# Patient Record
Sex: MALE | URBAN - METROPOLITAN AREA
[De-identification: names, ages, dates, MRNs, and addresses within clinical notes are randomized per-mention and may not be internally consistent; named-entity substitution may affect disease eponyms.]

---

## 2020-05-22 ENCOUNTER — APPOINTMENT (RX ONLY)
Dept: URBAN - METROPOLITAN AREA CLINIC 157 | Facility: CLINIC | Age: 45
Setting detail: DERMATOLOGY
End: 2020-05-22

## 2020-05-22 DIAGNOSIS — D22 MELANOCYTIC NEVI: ICD-10-CM

## 2020-05-22 DIAGNOSIS — L91.8 OTHER HYPERTROPHIC DISORDERS OF THE SKIN: ICD-10-CM

## 2020-05-22 DIAGNOSIS — L73.8 OTHER SPECIFIED FOLLICULAR DISORDERS: ICD-10-CM

## 2020-05-22 DIAGNOSIS — Z85.828 PERSONAL HISTORY OF OTHER MALIGNANT NEOPLASM OF SKIN: ICD-10-CM

## 2020-05-22 DIAGNOSIS — D18.0 HEMANGIOMA: ICD-10-CM

## 2020-05-22 DIAGNOSIS — D49.2 NEOPLASM OF UNSPECIFIED BEHAVIOR OF BONE, SOFT TISSUE, AND SKIN: ICD-10-CM

## 2020-05-22 PROBLEM — D22.71 MELANOCYTIC NEVI OF RIGHT LOWER LIMB, INCLUDING HIP: Status: ACTIVE | Noted: 2020-05-22

## 2020-05-22 PROBLEM — D18.01 HEMANGIOMA OF SKIN AND SUBCUTANEOUS TISSUE: Status: ACTIVE | Noted: 2020-05-22

## 2020-05-22 PROBLEM — D22.5 MELANOCYTIC NEVI OF TRUNK: Status: ACTIVE | Noted: 2020-05-22

## 2020-05-22 PROCEDURE — ? BIOPSY BY SHAVE METHOD

## 2020-05-22 PROCEDURE — 11102 TANGNTL BX SKIN SINGLE LES: CPT

## 2020-05-22 PROCEDURE — 11200 RMVL SKIN TAGS UP TO&INC 15: CPT | Mod: 59

## 2020-05-22 PROCEDURE — 99203 OFFICE O/P NEW LOW 30 MIN: CPT | Mod: 25

## 2020-05-22 PROCEDURE — ? SKIN TAG REMOVAL

## 2020-05-22 PROCEDURE — ? COUNSELING

## 2020-05-22 ASSESSMENT — LOCATION DETAILED DESCRIPTION DERM
LOCATION DETAILED: LEFT CLAVICULAR NECK
LOCATION DETAILED: RIGHT SUPERIOR UPPER BACK
LOCATION DETAILED: RIGHT MEDIAL FOREHEAD
LOCATION DETAILED: RIGHT AXILLARY VAULT
LOCATION DETAILED: RIGHT INFERIOR LATERAL MIDBACK
LOCATION DETAILED: LEFT MEDIAL UPPER BACK
LOCATION DETAILED: LEFT SUPERIOR PARIETAL SCALP
LOCATION DETAILED: RIGHT DISTAL POSTERIOR THIGH

## 2020-05-22 ASSESSMENT — LOCATION ZONE DERM
LOCATION ZONE: AXILLAE
LOCATION ZONE: TRUNK
LOCATION ZONE: NECK
LOCATION ZONE: LEG
LOCATION ZONE: SCALP
LOCATION ZONE: FACE

## 2020-05-22 ASSESSMENT — LOCATION SIMPLE DESCRIPTION DERM
LOCATION SIMPLE: RIGHT UPPER BACK
LOCATION SIMPLE: RIGHT LOWER BACK
LOCATION SIMPLE: LEFT ANTERIOR NECK
LOCATION SIMPLE: RIGHT AXILLARY VAULT
LOCATION SIMPLE: LEFT UPPER BACK
LOCATION SIMPLE: RIGHT FOREHEAD
LOCATION SIMPLE: RIGHT POSTERIOR THIGH
LOCATION SIMPLE: SCALP

## 2020-05-22 NOTE — PROCEDURE: BIOPSY BY SHAVE METHOD
Silver Nitrate Text: The wound bed was treated with silver nitrate after the biopsy was performed.
Validate Note Data (See Information Below): Yes
Validate That Anesthesia Is Not 0: No
Notification Instructions: Patient will be notified of biopsy results. However, patient instructed to call the office if not contacted within 2 weeks.
Hemostasis: Geetha's
Consent: Written consent was obtained and risks were reviewed including but not limited to scarring, infection, bleeding, scabbing, incomplete removal, nerve damage and allergy to anesthesia.
Size Of Lesion In Cm: 0
Electrodesiccation Text: The wound bed was treated with electrodesiccation after the biopsy was performed.
Billing Type: Third-Party Bill
Biopsy Type: H and E
Wound Care: Vaseline
Type Of Destruction Used: Curettage
Biopsy Method: Dermablade
Dressing: bandage
Anesthesia Type: 1% lidocaine without epinephrine
Curettage Text: The wound bed was treated with curettage after the biopsy was performed.
Detail Level: Detailed
Anesthesia Volume In Cc: 0.5
Cryotherapy Text: The wound bed was treated with cryotherapy after the biopsy was performed.
Electrodesiccation And Curettage Text: The wound bed was treated with electrodesiccation and curettage after the biopsy was performed.
Depth Of Biopsy: dermis
Information: Selecting Yes will display possible errors in your note based on the variables you have selected. This validation is only offered as a suggestion for you. PLEASE NOTE THAT THE VALIDATION TEXT WILL BE REMOVED WHEN YOU FINALIZE YOUR NOTE. IF YOU WANT TO FAX A PRELIMINARY NOTE YOU WILL NEED TO TOGGLE THIS TO 'NO' IF YOU DO NOT WANT IT IN YOUR FAXED NOTE.
Post-Care Instructions: I reviewed with the patient in detail post-care instructions. Patient is to keep the biopsy site dry overnight, and then apply bacitracin twice daily until healed. Patient may apply hydrogen peroxide soaks to remove any crusting.

## 2020-05-22 NOTE — PROCEDURE: SKIN TAG REMOVAL
Consent: Written consent obtained and the risks of skin tag removal was reviewed with the patient including but not limited to bleeding, pigmentary change, infection, pain, and remote possibility of scarring.
Include Z78.9 (Other Specified Conditions Influencing Health Status) As An Associated Diagnosis?: No
Detail Level: Detailed
Medical Necessity Clause: This procedure was medically necessary because the lesions that were treated were:
Medical Necessity Information: It is in your best interest to select a reason for this procedure from the list below. All of these items fulfill various CMS LCD requirements except the new and changing color options.
Add Associated Diagnoses If Applicable When Selecting Medical Necessity: Yes

## 2022-05-03 PROCEDURE — 88300 SURGICAL PATH GROSS: CPT | Mod: 26,,, | Performed by: PATHOLOGY

## 2022-05-03 PROCEDURE — 82365 CALCULUS SPECTROSCOPY: CPT

## 2022-05-03 PROCEDURE — 82365 CALCULUS SPECTROSCOPY: CPT | Mod: 90 | Performed by: INTERNAL MEDICINE

## 2022-05-03 PROCEDURE — 88300 SURGICAL PATH GROSS: CPT | Mod: SUR | Performed by: INTERNAL MEDICINE

## 2022-05-03 PROCEDURE — 88300 SURGICAL PATHOLOGY: ICD-10-PCS | Mod: 26,,, | Performed by: PATHOLOGY

## 2022-05-09 ENCOUNTER — LAB REQUISITION (OUTPATIENT)
Dept: LAB | Facility: HOSPITAL | Age: 47
End: 2022-05-09
Attending: INTERNAL MEDICINE
Payer: COMMERCIAL

## 2022-05-09 DIAGNOSIS — N20.2 CALCULUS OF KIDNEY WITH CALCULUS OF URETER: ICD-10-CM

## 2022-05-10 LAB
ESTROGEN SERPL-MCNC: NORMAL PG/ML
INSULIN SERPL-ACNC: NORMAL U[IU]/ML
LAB AP GROSS DESCRIPTION: NORMAL
LAB AP LABORATORY NOTES: NORMAL
T3RU NFR SERPL: NORMAL %

## 2022-05-12 LAB
COMPN STONE: NORMAL
SPECIMEN SOURCE: NORMAL